# Patient Record
Sex: MALE | Race: WHITE | URBAN - METROPOLITAN AREA
[De-identification: names, ages, dates, MRNs, and addresses within clinical notes are randomized per-mention and may not be internally consistent; named-entity substitution may affect disease eponyms.]

---

## 2019-03-11 ENCOUNTER — OFFICE VISIT (OUTPATIENT)
Dept: URBAN - METROPOLITAN AREA CLINIC 16 | Facility: CLINIC | Age: 84
End: 2019-03-11
Payer: MEDICARE

## 2019-03-11 DIAGNOSIS — H10.13 ACUTE ATOPIC CONJUNCTIVITIS, BILATERAL: ICD-10-CM

## 2019-03-11 DIAGNOSIS — H11.33 CONJUNCTIVAL HEMORRHAGE, BILATERAL: Primary | ICD-10-CM

## 2019-03-11 PROCEDURE — 92002 INTRM OPH EXAM NEW PATIENT: CPT | Performed by: OPTOMETRIST

## 2019-03-11 ASSESSMENT — INTRAOCULAR PRESSURE
OS: 10
OD: 10

## 2019-03-11 NOTE — IMPRESSION/PLAN
Impression: Acute atopic conjunctivitis, bilateral: H10.13. OU. Plan: Cold compresses prn, [place] QD OU x 2-3 weeks. RTC x follow up 1-2 weeks.

## 2019-03-11 NOTE — IMPRESSION/PLAN
Impression: Conjunctival hemorrhage, bilateral: H11.33. OU. Plan: Subconjunctival hemorrhage OU. Discussed condition, advised to talk w/PCP about stopping blood thinners for 1-2 weeks while condition resolves. Ust AT's prn x comfort. RTC 1-2 weeks x follow up, refraction prn.

## 2019-03-21 ENCOUNTER — OFFICE VISIT (OUTPATIENT)
Dept: URBAN - METROPOLITAN AREA CLINIC 16 | Facility: CLINIC | Age: 84
End: 2019-03-21
Payer: MEDICARE

## 2019-03-21 DIAGNOSIS — Z96.1 PRESENCE OF PSEUDOPHAKIA: Primary | ICD-10-CM

## 2019-03-21 DIAGNOSIS — H35.3131 NONEXUDATIVE AGE-RELATED MACULAR DEGENERATION, BILATERAL, EARLY DRY STAGE: ICD-10-CM

## 2019-03-21 DIAGNOSIS — H52.223 REGULAR ASTIGMATISM, BILATERAL: ICD-10-CM

## 2019-03-21 PROCEDURE — V2799 MISC VISION ITEM OR SERVICE: HCPCS | Performed by: OPTOMETRIST

## 2019-03-21 ASSESSMENT — INTRAOCULAR PRESSURE
OS: 12
OD: 10

## 2019-03-21 ASSESSMENT — VISUAL ACUITY
OD: 20/25
OS: 20/30